# Patient Record
Sex: MALE | Race: OTHER
[De-identification: names, ages, dates, MRNs, and addresses within clinical notes are randomized per-mention and may not be internally consistent; named-entity substitution may affect disease eponyms.]

---

## 2020-12-23 ENCOUNTER — HOSPITAL ENCOUNTER (EMERGENCY)
Dept: HOSPITAL 15 - ER | Age: 45
Discharge: HOME | End: 2020-12-23
Payer: COMMERCIAL

## 2020-12-23 VITALS — BODY MASS INDEX: 35.79 KG/M2 | WEIGHT: 250 LBS | HEIGHT: 70 IN

## 2020-12-23 VITALS — SYSTOLIC BLOOD PRESSURE: 172 MMHG | DIASTOLIC BLOOD PRESSURE: 99 MMHG

## 2020-12-23 DIAGNOSIS — S82.121A: Primary | ICD-10-CM
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
## 2020-12-23 DIAGNOSIS — S83.421A: ICD-10-CM
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
## 2020-12-23 PROCEDURE — 73590 X-RAY EXAM OF LOWER LEG: CPT

## 2020-12-23 PROCEDURE — 29505 APPLICATION LONG LEG SPLINT: CPT

## 2020-12-23 PROCEDURE — 73562 X-RAY EXAM OF KNEE 3: CPT

## 2020-12-23 PROCEDURE — 93971 EXTREMITY STUDY: CPT

## 2024-12-26 ENCOUNTER — HOSPITAL ENCOUNTER (EMERGENCY)
Dept: HOSPITAL 15 - ER | Age: 49
Discharge: HOME | End: 2024-12-26
Payer: COMMERCIAL

## 2024-12-26 VITALS
TEMPERATURE: 98.7 F | SYSTOLIC BLOOD PRESSURE: 136 MMHG | OXYGEN SATURATION: 97 % | HEART RATE: 77 BPM | RESPIRATION RATE: 17 BRPM | DIASTOLIC BLOOD PRESSURE: 89 MMHG

## 2024-12-26 VITALS — HEIGHT: 62 IN | WEIGHT: 171.74 LBS | BODY MASS INDEX: 31.6 KG/M2
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
## 2024-12-26 DIAGNOSIS — S16.1XXA: Primary | ICD-10-CM
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
## 2024-12-26 DIAGNOSIS — I10: ICD-10-CM
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
## 2024-12-26 DIAGNOSIS — S39.012A: ICD-10-CM

## 2024-12-26 PROCEDURE — 72100 X-RAY EXAM L-S SPINE 2/3 VWS: CPT

## 2024-12-26 PROCEDURE — 72040 X-RAY EXAM NECK SPINE 2-3 VW: CPT
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
